# Patient Record
Sex: FEMALE | Race: WHITE
[De-identification: names, ages, dates, MRNs, and addresses within clinical notes are randomized per-mention and may not be internally consistent; named-entity substitution may affect disease eponyms.]

---

## 2018-12-07 ENCOUNTER — HOSPITAL ENCOUNTER (OUTPATIENT)
Dept: HOSPITAL 74 - FASU | Age: 53
Discharge: HOME | End: 2018-12-07
Attending: ORTHOPAEDIC SURGERY
Payer: COMMERCIAL

## 2018-12-07 VITALS — BODY MASS INDEX: 24.8 KG/M2

## 2018-12-07 VITALS — SYSTOLIC BLOOD PRESSURE: 118 MMHG | DIASTOLIC BLOOD PRESSURE: 70 MMHG

## 2018-12-07 VITALS — HEART RATE: 53 BPM | TEMPERATURE: 97.5 F

## 2018-12-07 DIAGNOSIS — S83.241A: Primary | ICD-10-CM
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
## 2018-12-07 DIAGNOSIS — M65.861: ICD-10-CM
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
## 2018-12-07 DIAGNOSIS — S83.8X1A: ICD-10-CM

## 2018-12-07 DIAGNOSIS — S83.281A: ICD-10-CM

## 2018-12-07 PROCEDURE — 0SBC4ZZ EXCISION OF RIGHT KNEE JOINT, PERCUTANEOUS ENDOSCOPIC APPROACH: ICD-10-PCS | Performed by: ORTHOPAEDIC SURGERY

## 2018-12-09 NOTE — OP
DATE OF OPERATION:  12/07/2018

 

SURGEON:  Richard Torres MD

 

ASSISTANT:  RENETTA Story

 

PREOPERATIVE DIAGNOSES:

1.  Right knee medial and lateral meniscal tear.

2.  Right knee cartilage injury.

3.  Right knee synovitis.

 

POSTOPERATIVE DIAGNOSES:

1.  Right knee medial and lateral meniscal tear.

2.  Right knee cartilage injury.

3.  Right knee synovitis.

 

PROCEDURE:

1.  Right knee arthroscopy with partial meniscectomy, medial and lateral meniscus;

CPT code 61172.

2.  Right knee arthroscopy with chondroplasty and abrasionplasty; CPT code 13085.

3.  Right knee arthroscopy with synovectomy; CPT code 87132.

 

FINDINGS:

1.  Medial meniscus body and posterior horn tear.

2.  Lateral meniscus posterior horn tear.

3.  Synovitis of patellofemoral unilateral notch area.

4.  Essentially grade 2 cartilage injury, medial femoral condyle.

5.  ACL and PCL intact.

6.  _____ grade 1 to 2 cartilage injury, lateral tibial plateau.

7.  Essentially grade 2 to 4 cartilage injury, patellofemoral trochlea and

patellofemoral joint.

8.  Posterior 1/3, inner 1/3 undersurface tear, split tear of posterior 1/3, medial

meniscus.

 

PROCEDURE:  Informed consent was obtained.  The patient came to the operating room,

where the lower extremity was prepped and draped in a sterile fashion.  A tourniquet

was placed on the upper thigh, but not inflated. 

 

Using standard arthroscopic technique, a lateral incision and portal was made to

allow for introduction of the camera into the suprapatellar bursa.  This was then

taken to the medial joint line, where under direct visualization, a medial incision

and portal was made.

 

Excessive synovium noted in the medial, lateral and patellofemoral and notch area was

removed by an upbiter, shaver and Bovie cautery.  This was found to bring in

inflammatory tissue into the joint surface, a source of pain and dysfunction. 

 

Probing of the medial and lateral meniscus found tears, as described in the findings.

 These were removed with the upbiter and shaver and taken back to a stable rim. 

 

Grade 2 to 3 degenerative changes were treated with a chondroplasty, removing all

flaking surfaces with low-setting Bovie along the periphery to prevent further

flaking.  

 

Grade 4 changes, as noted, were treated with an abrasionplasty, creating a bleeding

surface at the bone/cartilage interface.  Aggressive debridement with shaver/jose

created bleeding surface.  Micro fracture also done when indicated in findings.

 

All areas of the knee were once again reexamined.  The knee was then drained and a

single suture was placed in all portals.  A sterile dressing was placed and the

patient was transferred to the recovery room without complication. 

 

The PA listed above was present and assisted at surgery.  Their presence was

absolutely medically necessary for the completion of the procedure.  They helped hold

the arthroscopy, pass instruments (and implants when indicated) and the procedure

could not have been completed without their assistance.

 

 

RICHARD TORRES M.D.

 

[id number]

DD: 12/09/2018 10:24

DT: 12/09/2018 14:55

Job #:  [id number]

## 2018-12-12 NOTE — PATH
Surgical Pathology Report



Patient Name:  PIEDAD GAMBINO

Accession #:  [id number]

[facility]. Rec. #:  [id number]                                                        

   /Age/Gender:  1965 (Age: 53) / F

Account:  [id number]                                                          

             Location: [facility] 

Taken:  2018

Received:  2018

Reported:  2018

Physicians:  Richard Tan M.D.

  



Specimen(s) Received

 RIGHT KNEE SHAVINGS 





Clinical History

Right knee internal derangement







Final Diagnosis

KNEE, RIGHT, ARTHROSCOPIC SHAVINGS:

FIBROSYNOVIAL TISSUE AND CARTILAGE.





***Electronically Signed***

Marbella Feliciano M.D.





Gross Description

Received in formalin, labeled "right knee shavings," is a 4.0 x 3.8 x 0.4 cm.

aggregate of tan-yellow soft tissue fragments. A representative portion is

submitted in one cassette.

/12/10/2018



saudi/12/10/2018